# Patient Record
Sex: FEMALE | Race: WHITE | NOT HISPANIC OR LATINO | Employment: FULL TIME | URBAN - METROPOLITAN AREA
[De-identification: names, ages, dates, MRNs, and addresses within clinical notes are randomized per-mention and may not be internally consistent; named-entity substitution may affect disease eponyms.]

---

## 2018-12-11 ENCOUNTER — PATIENT OUTREACH (OUTPATIENT)
Dept: CARE COORDINATION | Facility: CLINIC | Age: 38
End: 2018-12-11

## 2018-12-11 DIAGNOSIS — F41.9 ANXIETY: Primary | ICD-10-CM

## 2019-10-12 ENCOUNTER — OFFICE VISIT (OUTPATIENT)
Dept: URGENT CARE | Facility: URGENT CARE | Age: 39
End: 2019-10-12
Payer: COMMERCIAL

## 2019-10-12 VITALS
WEIGHT: 137 LBS | DIASTOLIC BLOOD PRESSURE: 68 MMHG | HEART RATE: 103 BPM | RESPIRATION RATE: 20 BRPM | HEIGHT: 65 IN | OXYGEN SATURATION: 99 % | BODY MASS INDEX: 22.82 KG/M2 | SYSTOLIC BLOOD PRESSURE: 103 MMHG | TEMPERATURE: 99.3 F

## 2019-10-12 DIAGNOSIS — K52.9 GASTROENTERITIS: Primary | ICD-10-CM

## 2019-10-12 DIAGNOSIS — Z3A.10 10 WEEKS GESTATION OF PREGNANCY: ICD-10-CM

## 2019-10-12 PROBLEM — Z98.891 STATUS POST PRIMARY LOW TRANSVERSE CESAREAN SECTION: Status: ACTIVE | Noted: 2018-12-28

## 2019-10-12 PROCEDURE — 99204 OFFICE O/P NEW MOD 45 MIN: CPT | Performed by: FAMILY MEDICINE

## 2019-10-12 RX ORDER — DOXYLAMINE SUCCINATE AND PYRIDOXINE HYDROCHLORIDE, DELAYED RELEASE TABLETS 10 MG/10 MG 10; 10 MG/1; MG/1
1 TABLET, DELAYED RELEASE ORAL 3 TIMES DAILY PRN
Qty: 40 TABLET | Refills: 0 | Status: SHIPPED | OUTPATIENT
Start: 2019-10-12 | End: 2021-08-27

## 2019-10-12 ASSESSMENT — MIFFLIN-ST. JEOR: SCORE: 1297.31

## 2019-10-12 NOTE — PROGRESS NOTES
SUBJECTIVE:  Chief Complaint   Patient presents with     URI     STARTED 6 HOURS AGO- upper abdomen knots, diarrhea, fever and chills, aches     Carmen Reina is a 38 year old female whose symptoms began 6 hours ago and include abdominal pain entire abdomen, cramping, diarrhea, fever, chills, sweats, myalgias and nausea,  Has not vomited, but feels strong urge to vomit.   Patient denies URI symptoms and cough  Symptoms are sudden onset, still present and constant and moderate.    Has been drinking gatorade, urinated 4 x today    Associated symptoms:  Pain:  Mild diffuse, generalized crampy abdominal pain  Fever: tactile fevers, sweats and chills  Diarrhea:  consists of 4 stools/day and is persisting  Stools: watery and runny  Appetite: poor  Vomitin times  Prior to arrival in clinic-  Vomited about 6 cups of liquid in clinic  Risk factors: sick contacts and possible bad food exposure-   has similar illness started yesterday  Patient denies travel , recent antibiotic use, recent hospitalization, recent medication changes and hx of IBS    She is 10 weeks pregnant- has been having nausea, but has not been taking medication- she has B6 at home for nausea, but has not tried it    Patient Active Problem List   Diagnosis     Arrest of descent, delivered, current hospitalization     Status post primary low transverse  section       ALLERGIES:  Levofloxacin and Sulfa drugs    No current outpatient medications on file prior to visit.  No current facility-administered medications on file prior to visit.       Social hx- no smoking    Family History:  Non-contributory,  No associated family health conditions    Review Of Systems    Constitutional:    fevers, chills, fatigue  Skin: negative for rash or lesions  Eyes: negative for eye pain, visual changes  Ears/Nose/Throat: negative for earache  , sinus trouble, persistent sore throat  Respiratory: No shortness of breath, dyspnea on exertion    "  Cardiovascular: negative for, palpitations, tachycardia or chest pain  Gastrointestinal:   vomiting, abdominal pain , diarrhea  Genitourinary: negative for dysuria or hematuria-  10 wk pregnant  Back: negative for pain with back movement,  CVA pain  Musculoskeletal:  generalized muscle pain, without joint swelling or joint stiffness  Neurologic:  generalized   weakness , without  incoordination  Hematologic/Lymphatic/Immunologic: negative for allergies or swollen nodes  Endocrine: negative for thyroid disorder or diabetes    OBJECTIVE:  /68 (BP Location: Right arm, Patient Position: Sitting, Cuff Size: Adult Regular)   Pulse 103   Temp 99.3  F (37.4  C) (Tympanic)   Resp 20   Ht 1.651 m (5' 5\")   Wt 62.1 kg (137 lb)   SpO2 99%   Breastfeeding? No   BMI 22.80 kg/m      GENERAL APPEARANCE: fatigued, alert and mild  Distress-  Episode of vomiting then dry heaving in clinic, vomited about 6 cups of yellow liquid  EYES: EOMI,  PERRL, conjunctiva clear  HENT: ear canals and TM's normal.  Nose and mouth without ulcers, erythema or lesions  NECK: supple, nontender, no lymphadenopathy  RESP: lungs clear to auscultation - no rales, rhonchi or wheezes  CV: regular rates and rhythm, normal S1 S2, no murmur noted  ABDOMEN:  soft, mild, diffuse generalized tenderness, no HSM or masses and bowel sounds active   Extremities:  Motor, sensation intact,  Normal ROM  NEURO: Normal strength and tone, sensory exam grossly normal,  normal speech and mentation  SKIN: no suspicious lesions or rashes    ASSESSMENT:  Gastroenteritis     - Doxylamine-Pyridoxine 10-10 MG TBEC; Take 1 tablet by mouth 3 times daily as needed (nausea/ vomiting)    Diet: small amounts clear fluids frequently,soups,juices,water,advance diet as tolerated  Rest as much as possible.  Patient was advised to go to the ER if there is persistent vomiting and unable to keep down liquids  and/or severe weakness/ listlessness.    Follow-up with PCP if not " improving  Advise careful hand washing to reduce the risk of passing the illness to others in close contact      10 weeks gestation of pregnancy     - Doxylamine-Pyridoxine 10-10 MG TBEC; Take 1 tablet by mouth 3 times daily as needed (nausea/ vomiting)     If persistent vomiting, not responding to the doxylamine/ pyridoxine, go to ER for iv fluids  Discussed potential risks of viral illness in early pregnancy

## 2019-10-12 NOTE — PATIENT INSTRUCTIONS
"  Patient Education     Viral Gastroenteritis (Adult)    Gastroenteritis is commonly called the \"stomach flu,\" although it has nothing to do with influenza. It is most often caused by a virus that affects the stomach and intestinal tract and usually lasts from 2 to 7 days. Common viruses causing gastroenteritis include norovirus, rotavirus, and hepatitis A. Non-viral causes of gastroenteritis include bacteria, parasites, and toxins.  The danger from repeated vomiting or diarrhea is dehydration. This is the loss of too much fluid from the body. When this occurs, body fluids must be replaced. Antibiotics don't help with this illness because it is usually viral. Simple home treatment will be helpful.  Symptoms of viral gastroenteritis may include:    Watery, loose stools    Stomach pain or abdominal cramps    Fever and chills    Nausea and vomiting    Loss of bowel control    Headache  Home care  Gastroenteritis is transmitted by contact with the stool or vomit of an infected person. This can occur from person to person or from contact with a contaminated surface.  Follow these guidelines when caring for yourself at home:    If symptoms are severe, rest at home for the next 24 hours or until you are feeling better.    Wash your hands with soap and water or use alcohol-based  to prevent the spread of infection. Wash your hands after touching anyone who is sick.    Wash your hands or use alcohol-based  after using the toilet and before meals. Clean the toilet after each use.  Remember these tips when preparing food:    People with diarrhea should not prepare or serve food to others. When preparing foods, wash your hands before and after.    Wash your hands after using cutting boards, countertops, knives, or utensils that have been in contact with raw food.    Dry your hands with a single use towel.    Keep uncooked meats away from cooked and ready-to-eat foods.  Medicine  You may use acetaminophen or " NSAID medicines like ibuprofen or naproxen to control fever unless another medicine was given. If you have chronic liver or kidney disease, talk with your healthcare provider before using these medicines. Also talk with your provider if you've had a stomach ulcer or gastrointestinal bleeding. Don't give aspirin to anyone under 18 years of age who is ill with a fever. It may cause severe liver damage. Don't use NSAIDS is you are already taking one for another condition (like arthritis) or are on aspirin (such as for heart disease or after a stroke).  If medicine for vomiting or diarrhea are prescribed, take these only as directed. Nausea and diarrhea medicines are generally OK unless you have bleeding, fever, or severe abdominal pain.  Diet  Follow these guidelines for food:    Water and liquids are important so you don't get dehydrated. Drink a small amount at a time or suck on ice chips if you are vomiting.    If you eat, avoid fatty, greasy, spicy, or fried foods.    Don't eat dairy if you have diarrhea. This can make diarrhea worse.    Avoid tobacco, alcohol, and caffeine which may worsen symptoms.  During the first 24 hours (the first full day), follow the diet below:    Beverages. Sports drinks, soft drinks without caffeine, ginger ale, mineral water (plain or flavored), decaffeinated tea and coffee. If you are very dehydrated, sports drinks aren't a good choice. They have too much sugar and not enough electrolytes. In this case, commercially available products called oral rehydration solutions, are best.    Soups. Eat clear broth, consommé, and bouillon.    Desserts. Eat gelatin, ice pops, and fruit juice bars.  During the next 24 hours (the second day), you may add the following to the above:    Hot cereal, plain toast, bread, rolls, and crackers    Plain noodles, rice, mashed potatoes, chicken noodle or rice soup    Unsweetened canned fruit (avoid pineapple), bananas    Limit fat intake to less than 15 grams  per day. Do this by avoiding margarine, butter, oils, mayonnaise, sauces, gravies, fried foods, peanut butter, meat, poultry, and fish.    Limit fiber and avoid raw or cooked vegetables, fresh fruits (except bananas), and bran cereals.    Limit caffeine and chocolate. Don't use spices or seasonings other than salt.    Limit dairy products.    Avoid alcohol.  During the next 24 hours:    Gradually resume a normal diet as you feel better and your symptoms improve.    If at any time it starts getting worse again, go back to clear liquids until you feel better.  Follow-up care  Follow up with your healthcare provider, or as advised. Call your provider if you don't get better within 24 hours or if diarrhea lasts more than a week. Also follow up if you are unable to keep down liquids and get dehydrated. If a stool (diarrhea) sample was taken, call as directed for the results.  Call 911  Call 911 if any of these occur:    Trouble breathing    Chest pain    Confused    Severe drowsiness or trouble awakening    Fainting or loss of consciousness    Rapid heart rate    Seizure    Stiff neck  When to seek medical advice  Call your healthcare provider right away if any of these occur:    Abdominal pain that gets worse    Continued vomiting (unable to keep liquids down)    Frequent diarrhea (more than 5 times a day)    Blood in vomit or stool (black or red color)    Dark urine, reduced urine output, or extreme thirst    Weakness or dizziness    Drowsiness    Fever of 100.4 F (38 C) or higher, or as directed by your healthcare provider    New rash  Date Last Reviewed: 6/1/2018 2000-2018 [employer]. [address] [place] [address], [place] [address]. All rights reserved. This information is not intended as a substitute for professional medical care. Always follow your healthcare professional's instructions.           Patient Education     Hyperemesis Gravidarum  Hyperemesis gravidarum is a severe form of morning sickness  that can affect some women during pregnancy. It may develop around the 5th week and last until the 16th week of pregnancy. In some women, it may last longer. Symptoms include severe nausea and vomiting. This can lead to problems such as weight loss and dehydration.  It's not clear what causes hyperemesis gravidarum. It may be from rising hormone levels early in the pregnancy. It can be a serious threat to mother and fetus if symptoms are severe. Follow the advice below carefully. If your symptoms don't get better with home care measures, you may need to stay in the hospital. In the hospital, you may get IV (intravenous) fluids and medicines.  Home care  Diet    Keep a log of the foods you eat and how they affect your symptoms. Don't eat foods that trigger your symptoms.    Eat small meals often throughout the day rather than 3 large meals. This can help keep your stomach from being empty. An empty stomach can make nausea worse.    Choose foods that are high in carbohydrates. Eating foods high in protein may also help. Limit greasy or spicy foods.    Before getting out of bed in the morning, try eating crackers or dry toast. This may help settle your stomach.    Drink cold, clear liquids. Drinking small amounts of liquids with electrolytes, such as sports drinks, may help as well.  Medicine    If needed, your healthcare provider may prescribe certain medicines to help ease nausea and vomiting. Your provider may suggest vitamin B6 and darwin. Don t try any over-the-counter medicines or home remedies without talking with your provider first.  Follow-up care  Follow up with your healthcare provider, or as advised.  When to seek medical advice  Call your healthcare provider right away if any of these occur:    Signs of dehydration. These include dry mouth, extreme thirst, dark urine or little urine output, dizziness, weakness, or fainting.    Vomiting that won t stop    Inability to keep down liquids    Frequent  diarrhea    Weight loss or no weight gain over a 2-week period    Severe constant pain in the lower right abdomen    Fever of 100.4 F (38 C) or higher, or as directed by your healthcare provider  Date Last Reviewed: 6/1/2018 2000-2018 [employer]. [address] [place] [id number]. All rights reserved. This information is not intended as a substitute for professional medical care. Always follow your healthcare professional's instructions.

## 2021-04-25 ENCOUNTER — HEALTH MAINTENANCE LETTER (OUTPATIENT)
Age: 41
End: 2021-04-25

## 2021-08-24 ASSESSMENT — ENCOUNTER SYMPTOMS
COUGH: 0
ARTHRALGIAS: 0
JOINT SWELLING: 0
MYALGIAS: 0
FREQUENCY: 0
HEARTBURN: 0
PALPITATIONS: 0
ABDOMINAL PAIN: 0
EYE PAIN: 0
SORE THROAT: 0
HEMATURIA: 0
HEMATOCHEZIA: 0
CHILLS: 0
BREAST MASS: 0
CONSTIPATION: 0
HEADACHES: 0
DYSURIA: 0
DIZZINESS: 0
PARESTHESIAS: 0
FEVER: 0
NAUSEA: 0
SHORTNESS OF BREATH: 0
WEAKNESS: 0
DIARRHEA: 0
NERVOUS/ANXIOUS: 1

## 2021-08-27 ENCOUNTER — OFFICE VISIT (OUTPATIENT)
Dept: FAMILY MEDICINE | Facility: CLINIC | Age: 41
End: 2021-08-27
Payer: COMMERCIAL

## 2021-08-27 VITALS
TEMPERATURE: 97.2 F | HEART RATE: 58 BPM | DIASTOLIC BLOOD PRESSURE: 70 MMHG | BODY MASS INDEX: 19.77 KG/M2 | WEIGHT: 123 LBS | RESPIRATION RATE: 12 BRPM | SYSTOLIC BLOOD PRESSURE: 103 MMHG | OXYGEN SATURATION: 100 % | HEIGHT: 66 IN

## 2021-08-27 DIAGNOSIS — Z12.31 ENCOUNTER FOR SCREENING MAMMOGRAM FOR BREAST CANCER: ICD-10-CM

## 2021-08-27 DIAGNOSIS — Z11.59 NEED FOR HEPATITIS C SCREENING TEST: ICD-10-CM

## 2021-08-27 DIAGNOSIS — Z13.6 CARDIOVASCULAR SCREENING; LDL GOAL LESS THAN 160: ICD-10-CM

## 2021-08-27 DIAGNOSIS — F90.9 ATTENTION DEFICIT HYPERACTIVITY DISORDER (ADHD), UNSPECIFIED ADHD TYPE: ICD-10-CM

## 2021-08-27 DIAGNOSIS — Z00.00 ROUTINE HISTORY AND PHYSICAL EXAMINATION OF ADULT: Primary | ICD-10-CM

## 2021-08-27 DIAGNOSIS — Z11.4 SCREENING FOR HIV (HUMAN IMMUNODEFICIENCY VIRUS): ICD-10-CM

## 2021-08-27 LAB
CHOLEST SERPL-MCNC: 225 MG/DL
FASTING STATUS PATIENT QL REPORTED: YES
HCV AB SERPL QL IA: NONREACTIVE
HDLC SERPL-MCNC: 106 MG/DL
HIV 1+2 AB+HIV1 P24 AG SERPL QL IA: NONREACTIVE
LDLC SERPL CALC-MCNC: 112 MG/DL
NONHDLC SERPL-MCNC: 119 MG/DL
TRIGL SERPL-MCNC: 35 MG/DL

## 2021-08-27 PROCEDURE — 87389 HIV-1 AG W/HIV-1&-2 AB AG IA: CPT | Performed by: INTERNAL MEDICINE

## 2021-08-27 PROCEDURE — 80061 LIPID PANEL: CPT | Performed by: INTERNAL MEDICINE

## 2021-08-27 PROCEDURE — 86803 HEPATITIS C AB TEST: CPT | Performed by: INTERNAL MEDICINE

## 2021-08-27 PROCEDURE — 36415 COLL VENOUS BLD VENIPUNCTURE: CPT | Performed by: INTERNAL MEDICINE

## 2021-08-27 PROCEDURE — 99386 PREV VISIT NEW AGE 40-64: CPT | Performed by: INTERNAL MEDICINE

## 2021-08-27 ASSESSMENT — MIFFLIN-ST. JEOR: SCORE: 1231.73

## 2021-08-27 NOTE — PROGRESS NOTES
SUBJECTIVE:   CC: Carmen Reina is an 40 year old woman who presents for preventive health visit.       Patient has been advised of split billing requirements and indicates understanding: Yes  Healthy Habits:     Getting at least 3 servings of Calcium per day:  Yes    Bi-annual eye exam:  Yes    Dental care twice a year:  Yes    Sleep apnea or symptoms of sleep apnea:  Daytime drowsiness    Diet:  Regular (no restrictions)    Frequency of exercise:  2-3 days/week    Duration of exercise:  30-45 minutes    Taking medications regularly:  Not Applicable    PHQ-2 Total Score: 1    Additional concerns today:  Yes      Pap smear done on this date: 08/2020 (approximately), by this group: [facility] [place], results were normal.     Today's PHQ-2 Score:   PHQ-2 ( 1999 [employer]) 8/24/2021   Q1: Little interest or pleasure in doing things 0   Q2: Feeling down, depressed or hopeless 1   PHQ-2 Score 1   Q1: Little interest or pleasure in doing things Not at all   Q2: Feeling down, depressed or hopeless Several days   PHQ-2 Score 1       Abuse: Current or Past (Physical, Sexual or Emotional) - No  Do you feel safe in your environment? Yes    Have you ever done Advance Care Planning? (For example, a Health Directive, POLST, or a discussion with a medical provider or your loved ones about your wishes): No, advance care planning information given to patient to review.  Patient declined advance care planning discussion at this time.    Social History     Tobacco Use     Smoking status: Never Smoker     Smokeless tobacco: Never Used   Substance Use Topics     Alcohol use: Yes     If you drink alcohol do you typically have >3 drinks per day or >7 drinks per week? No    Alcohol Use 8/27/2021   Prescreen: >3 drinks/day or >7 drinks/week? -   Prescreen: >3 drinks/day or >7 drinks/week? No     Breast CA Risk Assessment (FHS-7) 8/24/2021   Did any of your first-degree relatives have breast or ovarian cancer? Yes   Did any of your  "relatives have bilateral breast cancer? No   Did any man in your family have breast cancer? No   Did any woman in your family have breast and ovarian cancer? Yes   Did any woman in your family have breast cancer before age 50 y? No   Do you have 2 or more relatives with breast and/or ovarian cancer? No   Do you have 2 or more relatives with breast and/or bowel cancer? No      Reviewed and updated as needed this visit by clinical staff  Tobacco  Allergies  Meds  Problems  Med Hx  Surg Hx  Fam Hx  Soc Hx          Reviewed and updated as needed this visit by Provider                [facility]  Specialists: gyn; ortho (shoulder-Dr Calixto-PRP injection, bothering again however; was recommended for surgery but hoping to delay a bit longer; might try stem cell next); rheum prn (hx seronegative RA, remission for years, saw rheum earlier this year with normal crp/tsh/esr/cmp/cbc)  Problem list and medications reviewed and updated. See below for additional notes.    Social: nonsmoker; 5 drinks a week  Significant FHx: mom-heart attack (54 when passed away)-rheumatic fever  Exercise/Diet: as per above; ; active    Pap: 8/2020 [facility]-gyn-normal  Mammo: 2020-normal  Hep C screening: due  HIV Screening: due    Tdap: UTD  COVID: UTD    Has been 6 years since on adderall.   Sees a therapist  Carmen is feeling like her symptoms of focus/concentration were better on adderall and hoping to go back on. She does not currently have a psychiatrist      Review of Systems  10 point ROS of systems including Constitutional, Eyes, Respiratory, Cardiovascular, Gastroenterology, Genitourinary, Integumentary, Muscularskeletal, Psychiatric were all negative except for pertinent positives noted in my HPI.       OBJECTIVE:   /70 (BP Location: Left arm, Cuff Size: Adult Regular)   Pulse 58   Temp 97.2  F (36.2  C) (Tympanic)   Resp 12   Ht 1.664 m (5' 5.5\")   Wt 55.8 kg (123 lb)   " SpO2 100%   BMI 20.16 kg/m    Physical Exam    GENERAL APPEARANCE: AAOx3, no distress. Well developed.    EYES:  PERRL. Conjunctivae clear without exudates. Sclera non-icteric.    NECK: Supple without adenopathy, Trachea is midline. No masses palpated.    RESP: Lungs CTA bilaterally. No w/r/r. No distress     CV: RRR, S1/S2 present. No m/r/c.     ABDOMEN:  soft, nontender, no distention. No rebound or guarding.     EXT: No c/c/e in lower extremities b/l. No rashes or deformities noted.    MSK: ROM and strength intact in four extremities. No gross deformities noted.    SKIN: no suspicious lesions or rashes    NEURO: AAOx3, Motor function intact w/ 5/5 strength bilaterally to UE and LE. Sensation intact bilaterally. Speech is fluent and comprehension intact. No gait abnormalities noted.    PSYCH: appropriate mood and affect.   BREAST: bilateral exam without masses, tenderness or nipple discharge; no palpable axillary masses or adenopathy      ASSESSMENT/PLAN:   Carmen was seen today for physical.    Diagnoses and all orders for this visit:    Routine history and physical examination of adult  This is a pleasant 40 w/o who remains very active, excellent lifestyle habits, UTD w/ mammo and pap through gyn, due for few labs including lipids:  -     Lipid panel reflex to direct LDL Fasting; Future    Screening for HIV (human immunodeficiency virus)  -     HIV Antigen Antibody Combo; Future    Need for hepatitis C screening test  -     Hepatitis C Screen Reflex to HCV RNA Quant and Genotype; Future    CARDIOVASCULAR SCREENING; LDL GOAL LESS THAN 160   Check lipids    Encounter for screening mammogram for breast cancer   UTD, yearly recommended    Attention deficit hyperactivity disorder (ADHD), unspecified ADHD type  Has been off adderall x 6 years, inquires about going back on. Recommend establish with psychiatrist for eval and management. Referred to [facility].   -     MENTAL HEALTH REFERRAL  - Adult; Psychiatry;  "Psychiatry; Other: [facility] [phone]; Patient call to schedule; Future    Other orders  -     REVIEW OF HEALTH MAINTENANCE PROTOCOL ORDERS        COUNSELING:  Reviewed preventive health counseling, as reflected in patient instructions       Regular exercise       Healthy diet/nutrition    Estimated body mass index is 20.16 kg/m  as calculated from the following:    Height as of this encounter: 1.664 m (5' 5.5\").    Weight as of this encounter: 55.8 kg (123 lb).        She reports that she has never smoked. She has never used smokeless tobacco.      Counseling Resources:  ATP IV Guidelines  Pooled Cohorts Equation Calculator  Breast Cancer Risk Calculator  BRCA-Related Cancer Risk Assessment: FHS-7 Tool  FRAX Risk Assessment  ICSI Preventive Guidelines  Dietary Guidelines for Americans, 2010  USDA's MyPlate  ASA Prophylaxis  Lung CA Screening    DO JAMES Clifford [facility]  "

## 2021-08-27 NOTE — Clinical Note
Pap smear done on this date: 08/2020 (approximately), by this group: [facility] [place], results were normal.

## 2021-10-05 ENCOUNTER — NURSE TRIAGE (OUTPATIENT)
Dept: FAMILY MEDICINE | Facility: CLINIC | Age: 41
End: 2021-10-05

## 2021-10-05 NOTE — CONFIDENTIAL NOTE
"Calling \"I think I have a staph infection\"  Getting worse so quickly.  Took a shower last night, pin feeling on shin  Woke up this AM, oozing, painful  At work, looked at it, significantly bigger  Rash/yellow pus, hurts  Chills this morning, but unsure if had fever  \"grew five times the ize over night\"  Left leg  Itch: yes, but I don't want to touch it  Moderate pain  Other symptoms denies  abx recently?: no    Patient has appt tomorrow with Jennika, but patient states its worsening, advised patient to be seen today.   NO openings. Advised UC.     Triaged per Epic Triage Protocol, gave care advice which patient plans to follow.  See Care advice tab for more information.  Patent to call back if further questions or concerns.    Patient will keep appt with Jennika tomorrow and will call and cancel if not needed.    Tariq Story RN  [facility]                                            "

## 2021-10-10 ENCOUNTER — HEALTH MAINTENANCE LETTER (OUTPATIENT)
Age: 41
End: 2021-10-10

## 2021-11-18 ENCOUNTER — IMMUNIZATION (OUTPATIENT)
Dept: NURSING | Facility: CLINIC | Age: 41
End: 2021-11-18
Payer: COMMERCIAL

## 2021-11-18 PROCEDURE — 91300 PR COVID VAC PFIZER DIL RECON 30 MCG/0.3 ML IM: CPT

## 2021-11-18 PROCEDURE — 0004A PR COVID VAC PFIZER DIL RECON 30 MCG/0.3 ML IM: CPT

## 2021-11-18 PROCEDURE — 90686 IIV4 VACC NO PRSV 0.5 ML IM: CPT

## 2021-11-18 PROCEDURE — 90471 IMMUNIZATION ADMIN: CPT

## 2022-01-27 ENCOUNTER — TRANSFERRED RECORDS (OUTPATIENT)
Dept: HEALTH INFORMATION MANAGEMENT | Facility: CLINIC | Age: 42
End: 2022-01-27

## 2022-04-06 ENCOUNTER — TRANSFERRED RECORDS (OUTPATIENT)
Dept: HEALTH INFORMATION MANAGEMENT | Facility: CLINIC | Age: 42
End: 2022-04-06
Payer: COMMERCIAL

## 2022-09-09 ENCOUNTER — TRANSFERRED RECORDS (OUTPATIENT)
Dept: HEALTH INFORMATION MANAGEMENT | Facility: CLINIC | Age: 42
End: 2022-09-09

## 2022-09-18 ENCOUNTER — HEALTH MAINTENANCE LETTER (OUTPATIENT)
Age: 42
End: 2022-09-18

## 2023-01-28 ENCOUNTER — HEALTH MAINTENANCE LETTER (OUTPATIENT)
Age: 43
End: 2023-01-28

## 2023-05-03 ASSESSMENT — ENCOUNTER SYMPTOMS
DYSURIA: 0
HEADACHES: 0
EYE PAIN: 0
JOINT SWELLING: 0
HEMATURIA: 0
PALPITATIONS: 0
BREAST MASS: 0
DIARRHEA: 0
WEAKNESS: 0
HEARTBURN: 0
SHORTNESS OF BREATH: 0
COUGH: 0
ARTHRALGIAS: 0
CONSTIPATION: 0
HEMATOCHEZIA: 0
FREQUENCY: 0
NAUSEA: 0
FEVER: 0
NERVOUS/ANXIOUS: 0
DIZZINESS: 0
CHILLS: 0
MYALGIAS: 0
SORE THROAT: 0
PARESTHESIAS: 0
ABDOMINAL PAIN: 0

## 2023-05-03 ASSESSMENT — PATIENT HEALTH QUESTIONNAIRE - PHQ9
10. IF YOU CHECKED OFF ANY PROBLEMS, HOW DIFFICULT HAVE THESE PROBLEMS MADE IT FOR YOU TO DO YOUR WORK, TAKE CARE OF THINGS AT HOME, OR GET ALONG WITH OTHER PEOPLE: SOMEWHAT DIFFICULT
SUM OF ALL RESPONSES TO PHQ QUESTIONS 1-9: 7
SUM OF ALL RESPONSES TO PHQ QUESTIONS 1-9: 7

## 2023-05-08 ENCOUNTER — OFFICE VISIT (OUTPATIENT)
Dept: FAMILY MEDICINE | Facility: CLINIC | Age: 43
End: 2023-05-08
Payer: COMMERCIAL

## 2023-05-08 VITALS
DIASTOLIC BLOOD PRESSURE: 74 MMHG | RESPIRATION RATE: 16 BRPM | BODY MASS INDEX: 21.13 KG/M2 | SYSTOLIC BLOOD PRESSURE: 119 MMHG | HEART RATE: 71 BPM | WEIGHT: 131.5 LBS | TEMPERATURE: 98.1 F | HEIGHT: 66 IN | OXYGEN SATURATION: 97 %

## 2023-05-08 DIAGNOSIS — Z00.00 ROUTINE HISTORY AND PHYSICAL EXAMINATION OF ADULT: Primary | ICD-10-CM

## 2023-05-08 DIAGNOSIS — Z12.4 CERVICAL CANCER SCREENING: ICD-10-CM

## 2023-05-08 DIAGNOSIS — R53.83 OTHER FATIGUE: ICD-10-CM

## 2023-05-08 LAB
ALBUMIN SERPL BCG-MCNC: 4.5 G/DL (ref 3.5–5.2)
ALP SERPL-CCNC: 52 U/L (ref 35–104)
ALT SERPL W P-5'-P-CCNC: 11 U/L (ref 10–35)
ANION GAP SERPL CALCULATED.3IONS-SCNC: 11 MMOL/L (ref 7–15)
AST SERPL W P-5'-P-CCNC: 18 U/L (ref 10–35)
BASOPHILS # BLD AUTO: 0 10E3/UL (ref 0–0.2)
BASOPHILS NFR BLD AUTO: 1 %
BILIRUB SERPL-MCNC: 0.4 MG/DL
BUN SERPL-MCNC: 11.2 MG/DL (ref 6–20)
CALCIUM SERPL-MCNC: 9.4 MG/DL (ref 8.6–10)
CHLORIDE SERPL-SCNC: 102 MMOL/L (ref 98–107)
CHOLEST SERPL-MCNC: 188 MG/DL
CREAT SERPL-MCNC: 0.8 MG/DL (ref 0.51–0.95)
DEPRECATED HCO3 PLAS-SCNC: 24 MMOL/L (ref 22–29)
EOSINOPHIL # BLD AUTO: 0.2 10E3/UL (ref 0–0.7)
EOSINOPHIL NFR BLD AUTO: 3 %
ERYTHROCYTE [DISTWIDTH] IN BLOOD BY AUTOMATED COUNT: 12.7 % (ref 10–15)
FOLATE SERPL-MCNC: 28.7 NG/ML (ref 4.6–34.8)
GFR SERPL CREATININE-BSD FRML MDRD: >90 ML/MIN/1.73M2
GLUCOSE SERPL-MCNC: 115 MG/DL (ref 70–99)
HCT VFR BLD AUTO: 38.8 % (ref 35–47)
HDLC SERPL-MCNC: 97 MG/DL
HGB BLD-MCNC: 12.8 G/DL (ref 11.7–15.7)
IMM GRANULOCYTES # BLD: 0 10E3/UL
IMM GRANULOCYTES NFR BLD: 0 %
IRON BINDING CAPACITY (ROCHE): 310 UG/DL (ref 240–430)
IRON SATN MFR SERPL: 41 % (ref 15–46)
IRON SERPL-MCNC: 126 UG/DL (ref 37–145)
LDLC SERPL CALC-MCNC: 81 MG/DL
LYMPHOCYTES # BLD AUTO: 2.2 10E3/UL (ref 0.8–5.3)
LYMPHOCYTES NFR BLD AUTO: 35 %
MCH RBC QN AUTO: 31.4 PG (ref 26.5–33)
MCHC RBC AUTO-ENTMCNC: 33 G/DL (ref 31.5–36.5)
MCV RBC AUTO: 95 FL (ref 78–100)
MONOCYTES # BLD AUTO: 0.4 10E3/UL (ref 0–1.3)
MONOCYTES NFR BLD AUTO: 6 %
NEUTROPHILS # BLD AUTO: 3.4 10E3/UL (ref 1.6–8.3)
NEUTROPHILS NFR BLD AUTO: 55 %
NONHDLC SERPL-MCNC: 91 MG/DL
PLATELET # BLD AUTO: 258 10E3/UL (ref 150–450)
POTASSIUM SERPL-SCNC: 4.1 MMOL/L (ref 3.4–5.3)
PROT SERPL-MCNC: 7.6 G/DL (ref 6.4–8.3)
RBC # BLD AUTO: 4.07 10E6/UL (ref 3.8–5.2)
SODIUM SERPL-SCNC: 137 MMOL/L (ref 136–145)
TRIGL SERPL-MCNC: 51 MG/DL
TSH SERPL DL<=0.005 MIU/L-ACNC: 1.14 UIU/ML (ref 0.3–4.2)
VIT B12 SERPL-MCNC: 554 PG/ML (ref 232–1245)
WBC # BLD AUTO: 6.2 10E3/UL (ref 4–11)

## 2023-05-08 PROCEDURE — 87624 HPV HI-RISK TYP POOLED RSLT: CPT | Performed by: INTERNAL MEDICINE

## 2023-05-08 PROCEDURE — 99396 PREV VISIT EST AGE 40-64: CPT | Performed by: INTERNAL MEDICINE

## 2023-05-08 PROCEDURE — 36415 COLL VENOUS BLD VENIPUNCTURE: CPT | Performed by: INTERNAL MEDICINE

## 2023-05-08 PROCEDURE — 82306 VITAMIN D 25 HYDROXY: CPT | Performed by: INTERNAL MEDICINE

## 2023-05-08 PROCEDURE — 83550 IRON BINDING TEST: CPT | Performed by: INTERNAL MEDICINE

## 2023-05-08 PROCEDURE — 80061 LIPID PANEL: CPT | Performed by: INTERNAL MEDICINE

## 2023-05-08 PROCEDURE — G0145 SCR C/V CYTO,THINLAYER,RESCR: HCPCS | Performed by: INTERNAL MEDICINE

## 2023-05-08 PROCEDURE — 82746 ASSAY OF FOLIC ACID SERUM: CPT | Performed by: INTERNAL MEDICINE

## 2023-05-08 PROCEDURE — 83540 ASSAY OF IRON: CPT | Performed by: INTERNAL MEDICINE

## 2023-05-08 PROCEDURE — 80050 GENERAL HEALTH PANEL: CPT | Performed by: INTERNAL MEDICINE

## 2023-05-08 PROCEDURE — 82607 VITAMIN B-12: CPT | Performed by: INTERNAL MEDICINE

## 2023-05-08 RX ORDER — DEXTROAMPHETAMINE SACCHARATE, AMPHETAMINE ASPARTATE MONOHYDRATE, DEXTROAMPHETAMINE SULFATE AND AMPHETAMINE SULFATE 2.5; 2.5; 2.5; 2.5 MG/1; MG/1; MG/1; MG/1
CAPSULE, EXTENDED RELEASE ORAL
COMMUNITY
Start: 2023-05-04

## 2023-05-08 ASSESSMENT — PAIN SCALES - GENERAL: PAINLEVEL: NO PAIN (0)

## 2023-05-08 NOTE — PROGRESS NOTES
SUBJECTIVE:   CC: Carmen is an 42 year old who presents for preventive health visit.        View : No data to display.              Patient has been advised of split billing requirements and indicates understanding: Yes  Healthy Habits:     Getting at least 3 servings of Calcium per day:  Yes    Bi-annual eye exam:  Yes    Dental care twice a year:  Yes    Sleep apnea or symptoms of sleep apnea:  Daytime drowsiness    Diet:  Regular (no restrictions)    Frequency of exercise:  2-3 days/week    Duration of exercise:  30-45 minutes    Taking medications regularly:  Yes    Medication side effects:  Not applicable    PHQ-2 Total Score: 2    Additional concerns today:  No              Today's PHQ-2 Score:       5/3/2023     8:22 PM   PHQ-2 ( 1999 [employer])   Q1: Little interest or pleasure in doing things 1   Q2: Feeling down, depressed or hopeless 1   PHQ-2 Score 2   Q1: Little interest or pleasure in doing things More than half the days    Several days   Q2: Feeling down, depressed or hopeless Several days    Several days   PHQ-2 Score 3    2           Social History     Tobacco Use     Smoking status: Never     Smokeless tobacco: Never   Vaping Use     Vaping status: Not on file   Substance Use Topics     Alcohol use: Yes             5/3/2023     8:22 PM   Alcohol Use   Prescreen: >3 drinks/day or >7 drinks/week? No     Reviewed orders with patient.  Reviewed health maintenance and updated orders accordingly -       Breast Cancer Screening:    [facility]HS-7:       8/24/2021     9:45 AM 5/3/2023     8:23 PM   Breast CA Risk Assessment ([facility]HS-7)   Did any of your first-degree relatives have breast or ovarian cancer? Yes Unknown   Did any of your relatives have bilateral breast cancer? No No   Did any man in your family have breast cancer? No No   Did any woman in your family have breast and ovarian cancer? Yes Unknown   Did any woman in your family have breast cancer before age 50 y? No Unknown   Do you have 2 or more relatives  "with breast and/or ovarian cancer? No Unknown   Do you have 2 or more relatives with breast and/or bowel cancer? No Unknown         Pertinent mammograms are reviewed under the imaging tab.    History of abnormal Pap smear:      Reviewed and updated as needed this visit by clinical staff   Tobacco  Allergies  Meds              Reviewed and updated as needed this visit by Provider                 Fatigue. No cp/sob. No f/c. No diarrhea. Exercising less. Denies anxiety/depression. Denies heavy or ongoing menses-has IUD    10 point ROS of systems including Constitutional, Eyes, Respiratory, Cardiovascular, Gastroenterology, Genitourinary, Integumentary, Muscularskeletal, Psychiatric were all negative except for pertinent positives noted in my HPI.         OBJECTIVE:   /74 (BP Location: Right arm, Patient Position: Sitting, Cuff Size: Adult Regular)   Pulse 71   Temp 98.1  F (36.7  C)   Resp 16   Ht 1.664 m (5' 5.5\")   Wt 59.6 kg (131 lb 8 oz)   SpO2 97%   Breastfeeding No   BMI 21.55 kg/m    Physical Exam    GENERAL APPEARANCE: AAOx3, no distress. Well developed.    EYES:  PERRL. Conjunctivae clear without exudates. Sclera non-icteric.    HENT: NC/AT. External ear and ear canal nontender and nonedematous bilaterally. No exudates. B/L TM intact. . Oral mucosa without ulcers or lesions. Pharynx w/o exudates, erythema, edema.    RESP: Lungs CTA bilaterally. No w/r/r. No distress     CV: RRR, S1/S2 present. No m/r/c.     ABDOMEN:  soft, nontender, no distention. No rebound or guarding.     EXT: No c/c/e in lower extremities b/l. No rashes or deformities noted.    MSK: ROM and strength intact in four extremities. No gross deformities noted.    SKIN: no suspicious lesions or rashes    NEURO: AAOx3, Motor function intact w/ 5/5 strength bilaterally to UE and LE. Sensation intact bilaterally. Speech is fluent and comprehension intact. No gait abnormalities noted.    PSYCH: appropriate mood and affect.   BREAST: " bilateral exam without masses, tenderness or nipple discharge; no palpable axillary masses or adenopathy   (female): External genitalia without masses, swelling, lesions or tenderness. Vagina is pink and moist without lesions or discharge. Cervix nontender without lesions or erosions.   Ovaries non-tender without palpable masses.        ASSESSMENT/PLAN:   Carmen was seen today for physical.    Diagnoses and all orders for this visit:    Routine history and physical examination of adult  Update labs:  -     CBC with Platelets & Differential; Future  -     Comprehensive metabolic panel; Future  -     Lipid panel reflex to direct LDL Fasting; Future    Cervical cancer screening  Due:  -     Pap Screen with HPV - recommended age 30 - 65 years    Other fatigue  Check relevant labs  -     TSH with free T4 reflex; Future  -     Vitamin B12; Future  -     Vitamin D Deficiency; Future  -     Folate; Future  -     Iron & Iron Binding Capacity; Future              She reports that she has never smoked. She has never used smokeless tobacco.    Chelsie Gomez DO  [facility]

## 2023-05-09 LAB — DEPRECATED CALCIDIOL+CALCIFEROL SERPL-MC: 52 UG/L (ref 20–75)

## 2023-05-10 LAB
BKR LAB AP GYN ADEQUACY: NORMAL
BKR LAB AP GYN INTERPRETATION: NORMAL
BKR LAB AP HPV REFLEX: NORMAL
BKR LAB AP PREVIOUS ABNORMAL: NORMAL
PATH REPORT.COMMENTS IMP SPEC: NORMAL
PATH REPORT.COMMENTS IMP SPEC: NORMAL
PATH REPORT.RELEVANT HX SPEC: NORMAL

## 2023-05-14 LAB
HUMAN PAPILLOMA VIRUS 16 DNA: NEGATIVE
HUMAN PAPILLOMA VIRUS 18 DNA: NEGATIVE
HUMAN PAPILLOMA VIRUS FINAL DIAGNOSIS: NORMAL
HUMAN PAPILLOMA VIRUS OTHER HR: NEGATIVE

## 2023-11-11 ENCOUNTER — TRANSFERRED RECORDS (OUTPATIENT)
Dept: HEALTH INFORMATION MANAGEMENT | Facility: CLINIC | Age: 43
End: 2023-11-11
Payer: COMMERCIAL

## 2024-01-05 ENCOUNTER — TRANSFERRED RECORDS (OUTPATIENT)
Dept: HEALTH INFORMATION MANAGEMENT | Facility: CLINIC | Age: 44
End: 2024-01-05
Payer: COMMERCIAL

## 2024-07-21 ENCOUNTER — HEALTH MAINTENANCE LETTER (OUTPATIENT)
Age: 44
End: 2024-07-21

## 2025-08-10 ENCOUNTER — HEALTH MAINTENANCE LETTER (OUTPATIENT)
Age: 45
End: 2025-08-10